# Patient Record
Sex: MALE | Race: WHITE | HISPANIC OR LATINO | Employment: FULL TIME | URBAN - METROPOLITAN AREA
[De-identification: names, ages, dates, MRNs, and addresses within clinical notes are randomized per-mention and may not be internally consistent; named-entity substitution may affect disease eponyms.]

---

## 2024-09-24 ENCOUNTER — HOSPITAL ENCOUNTER (OUTPATIENT)
Facility: HOSPITAL | Age: 34
Setting detail: OBSERVATION
Discharge: HOME | End: 2024-09-25
Attending: EMERGENCY MEDICINE | Admitting: EMERGENCY MEDICINE

## 2024-09-24 ENCOUNTER — APPOINTMENT (OUTPATIENT)
Dept: CT IMAGING | Facility: HOSPITAL | Age: 34
End: 2024-09-24

## 2024-09-24 ENCOUNTER — APPOINTMENT (OUTPATIENT)
Dept: ULTRASOUND IMAGING | Facility: HOSPITAL | Age: 34
End: 2024-09-24

## 2024-09-24 DIAGNOSIS — K37 APPENDICITIS, UNSPECIFIED APPENDICITIS TYPE: Primary | ICD-10-CM

## 2024-09-24 LAB
ALBUMIN SERPL-MCNC: 4.5 G/DL (ref 3.5–5.2)
ALBUMIN/GLOB SERPL: 2 G/DL
ALP SERPL-CCNC: 105 U/L (ref 39–117)
ALT SERPL W P-5'-P-CCNC: 60 U/L (ref 1–41)
ANION GAP SERPL CALCULATED.3IONS-SCNC: 8 MMOL/L (ref 5–15)
AST SERPL-CCNC: 32 U/L (ref 1–40)
BASOPHILS # BLD AUTO: 0.04 10*3/MM3 (ref 0–0.2)
BASOPHILS NFR BLD AUTO: 0.5 % (ref 0–1.5)
BILIRUB SERPL-MCNC: 0.2 MG/DL (ref 0–1.2)
BILIRUB UR QL STRIP: NEGATIVE
BUN SERPL-MCNC: 15 MG/DL (ref 6–20)
BUN/CREAT SERPL: 16 (ref 7–25)
CALCIUM SPEC-SCNC: 10.1 MG/DL (ref 8.6–10.5)
CHLORIDE SERPL-SCNC: 103 MMOL/L (ref 98–107)
CLARITY UR: CLEAR
CO2 SERPL-SCNC: 26 MMOL/L (ref 22–29)
COLOR UR: YELLOW
CREAT SERPL-MCNC: 0.94 MG/DL (ref 0.76–1.27)
DEPRECATED RDW RBC AUTO: 43.8 FL (ref 37–54)
EGFRCR SERPLBLD CKD-EPI 2021: 110.5 ML/MIN/1.73
EOSINOPHIL # BLD AUTO: 0.3 10*3/MM3 (ref 0–0.4)
EOSINOPHIL NFR BLD AUTO: 4.1 % (ref 0.3–6.2)
ERYTHROCYTE [DISTWIDTH] IN BLOOD BY AUTOMATED COUNT: 13.4 % (ref 12.3–15.4)
GLOBULIN UR ELPH-MCNC: 2.3 GM/DL
GLUCOSE SERPL-MCNC: 97 MG/DL (ref 65–99)
GLUCOSE UR STRIP-MCNC: NEGATIVE MG/DL
HCT VFR BLD AUTO: 45.1 % (ref 37.5–51)
HGB BLD-MCNC: 14.8 G/DL (ref 13–17.7)
HGB UR QL STRIP.AUTO: NEGATIVE
IMM GRANULOCYTES # BLD AUTO: 0.03 10*3/MM3 (ref 0–0.05)
IMM GRANULOCYTES NFR BLD AUTO: 0.4 % (ref 0–0.5)
KETONES UR QL STRIP: NEGATIVE
LEUKOCYTE ESTERASE UR QL STRIP.AUTO: NEGATIVE
LIPASE SERPL-CCNC: 16 U/L (ref 13–60)
LYMPHOCYTES # BLD AUTO: 2.64 10*3/MM3 (ref 0.7–3.1)
LYMPHOCYTES NFR BLD AUTO: 36 % (ref 19.6–45.3)
MCH RBC QN AUTO: 29.5 PG (ref 26.6–33)
MCHC RBC AUTO-ENTMCNC: 32.8 G/DL (ref 31.5–35.7)
MCV RBC AUTO: 90 FL (ref 79–97)
MONOCYTES # BLD AUTO: 0.65 10*3/MM3 (ref 0.1–0.9)
MONOCYTES NFR BLD AUTO: 8.9 % (ref 5–12)
NEUTROPHILS NFR BLD AUTO: 3.67 10*3/MM3 (ref 1.7–7)
NEUTROPHILS NFR BLD AUTO: 50.1 % (ref 42.7–76)
NITRITE UR QL STRIP: NEGATIVE
NRBC BLD AUTO-RTO: 0 /100 WBC (ref 0–0.2)
PH UR STRIP.AUTO: 5.5 [PH] (ref 5–8)
PLATELET # BLD AUTO: 165 10*3/MM3 (ref 140–450)
PMV BLD AUTO: 11.4 FL (ref 6–12)
POTASSIUM SERPL-SCNC: 4.5 MMOL/L (ref 3.5–5.2)
PROT SERPL-MCNC: 6.8 G/DL (ref 6–8.5)
PROT UR QL STRIP: NEGATIVE
RBC # BLD AUTO: 5.01 10*6/MM3 (ref 4.14–5.8)
SODIUM SERPL-SCNC: 137 MMOL/L (ref 136–145)
SP GR UR STRIP: 1.02 (ref 1–1.03)
UROBILINOGEN UR QL STRIP: NORMAL
WBC NRBC COR # BLD AUTO: 7.33 10*3/MM3 (ref 3.4–10.8)

## 2024-09-24 PROCEDURE — G0378 HOSPITAL OBSERVATION PER HR: HCPCS

## 2024-09-24 PROCEDURE — 83690 ASSAY OF LIPASE: CPT | Performed by: NURSE PRACTITIONER

## 2024-09-24 PROCEDURE — 80053 COMPREHEN METABOLIC PANEL: CPT | Performed by: NURSE PRACTITIONER

## 2024-09-24 PROCEDURE — 74177 CT ABD & PELVIS W/CONTRAST: CPT

## 2024-09-24 PROCEDURE — 99285 EMERGENCY DEPT VISIT HI MDM: CPT

## 2024-09-24 PROCEDURE — 93976 VASCULAR STUDY: CPT

## 2024-09-24 PROCEDURE — 76870 US EXAM SCROTUM: CPT

## 2024-09-24 PROCEDURE — 96374 THER/PROPH/DIAG INJ IV PUSH: CPT

## 2024-09-24 PROCEDURE — 25010000002 ONDANSETRON PER 1 MG: Performed by: NURSE PRACTITIONER

## 2024-09-24 PROCEDURE — 85025 COMPLETE CBC W/AUTO DIFF WBC: CPT | Performed by: NURSE PRACTITIONER

## 2024-09-24 PROCEDURE — 81003 URINALYSIS AUTO W/O SCOPE: CPT | Performed by: NURSE PRACTITIONER

## 2024-09-24 PROCEDURE — 25510000001 IOPAMIDOL 61 % SOLUTION: Performed by: EMERGENCY MEDICINE

## 2024-09-24 RX ORDER — AMOXICILLIN 250 MG
2 CAPSULE ORAL 2 TIMES DAILY PRN
Status: DISCONTINUED | OUTPATIENT
Start: 2024-09-24 | End: 2024-09-25 | Stop reason: HOSPADM

## 2024-09-24 RX ORDER — SODIUM CHLORIDE 9 MG/ML
40 INJECTION, SOLUTION INTRAVENOUS AS NEEDED
Status: DISCONTINUED | OUTPATIENT
Start: 2024-09-24 | End: 2024-09-25 | Stop reason: HOSPADM

## 2024-09-24 RX ORDER — IOPAMIDOL 612 MG/ML
100 INJECTION, SOLUTION INTRAVASCULAR
Status: COMPLETED | OUTPATIENT
Start: 2024-09-24 | End: 2024-09-24

## 2024-09-24 RX ORDER — BISACODYL 5 MG/1
5 TABLET, DELAYED RELEASE ORAL DAILY PRN
Status: DISCONTINUED | OUTPATIENT
Start: 2024-09-24 | End: 2024-09-25 | Stop reason: HOSPADM

## 2024-09-24 RX ORDER — SODIUM CHLORIDE, SODIUM LACTATE, POTASSIUM CHLORIDE, CALCIUM CHLORIDE 600; 310; 30; 20 MG/100ML; MG/100ML; MG/100ML; MG/100ML
75 INJECTION, SOLUTION INTRAVENOUS CONTINUOUS
Status: DISCONTINUED | OUTPATIENT
Start: 2024-09-25 | End: 2024-09-25 | Stop reason: HOSPADM

## 2024-09-24 RX ORDER — SODIUM CHLORIDE 0.9 % (FLUSH) 0.9 %
10 SYRINGE (ML) INJECTION AS NEEDED
Status: DISCONTINUED | OUTPATIENT
Start: 2024-09-24 | End: 2024-09-25 | Stop reason: HOSPADM

## 2024-09-24 RX ORDER — BISACODYL 10 MG
10 SUPPOSITORY, RECTAL RECTAL DAILY PRN
Status: DISCONTINUED | OUTPATIENT
Start: 2024-09-24 | End: 2024-09-25 | Stop reason: HOSPADM

## 2024-09-24 RX ORDER — POLYETHYLENE GLYCOL 3350 17 G/17G
17 POWDER, FOR SOLUTION ORAL DAILY PRN
Status: DISCONTINUED | OUTPATIENT
Start: 2024-09-24 | End: 2024-09-25 | Stop reason: HOSPADM

## 2024-09-24 RX ORDER — SODIUM CHLORIDE 0.9 % (FLUSH) 0.9 %
10 SYRINGE (ML) INJECTION EVERY 12 HOURS SCHEDULED
Status: DISCONTINUED | OUTPATIENT
Start: 2024-09-24 | End: 2024-09-25 | Stop reason: HOSPADM

## 2024-09-24 RX ORDER — HYDROCODONE BITARTRATE AND ACETAMINOPHEN 7.5; 325 MG/1; MG/1
1 TABLET ORAL ONCE
Status: COMPLETED | OUTPATIENT
Start: 2024-09-24 | End: 2024-09-24

## 2024-09-24 RX ORDER — ONDANSETRON 2 MG/ML
4 INJECTION INTRAMUSCULAR; INTRAVENOUS ONCE
Status: COMPLETED | OUTPATIENT
Start: 2024-09-24 | End: 2024-09-24

## 2024-09-24 RX ADMIN — IOPAMIDOL 85 ML: 612 INJECTION, SOLUTION INTRAVENOUS at 21:17

## 2024-09-24 RX ADMIN — ONDANSETRON 4 MG: 2 INJECTION, SOLUTION INTRAMUSCULAR; INTRAVENOUS at 21:03

## 2024-09-24 RX ADMIN — HYDROCODONE BITARTRATE AND ACETAMINOPHEN 1 TABLET: 7.5; 325 TABLET ORAL at 20:14

## 2024-09-25 VITALS
TEMPERATURE: 98.7 F | DIASTOLIC BLOOD PRESSURE: 67 MMHG | OXYGEN SATURATION: 98 % | RESPIRATION RATE: 18 BRPM | SYSTOLIC BLOOD PRESSURE: 107 MMHG | HEART RATE: 55 BPM | WEIGHT: 197 LBS | HEIGHT: 71 IN | BODY MASS INDEX: 27.58 KG/M2

## 2024-09-25 LAB
ALBUMIN SERPL-MCNC: 4.3 G/DL (ref 3.5–5.2)
ALBUMIN/GLOB SERPL: 1.9 G/DL
ALP SERPL-CCNC: 76 U/L (ref 39–117)
ALT SERPL W P-5'-P-CCNC: 59 U/L (ref 1–41)
ANION GAP SERPL CALCULATED.3IONS-SCNC: 7.1 MMOL/L (ref 5–15)
AST SERPL-CCNC: 32 U/L (ref 1–40)
BASOPHILS # BLD AUTO: 0.03 10*3/MM3 (ref 0–0.2)
BASOPHILS NFR BLD AUTO: 0.4 % (ref 0–1.5)
BILIRUB SERPL-MCNC: 0.5 MG/DL (ref 0–1.2)
BUN SERPL-MCNC: 12 MG/DL (ref 6–20)
BUN/CREAT SERPL: 14.1 (ref 7–25)
CALCIUM SPEC-SCNC: 9.6 MG/DL (ref 8.6–10.5)
CHLORIDE SERPL-SCNC: 104 MMOL/L (ref 98–107)
CO2 SERPL-SCNC: 25.9 MMOL/L (ref 22–29)
CREAT SERPL-MCNC: 0.85 MG/DL (ref 0.76–1.27)
DEPRECATED RDW RBC AUTO: 43.8 FL (ref 37–54)
EGFRCR SERPLBLD CKD-EPI 2021: 118.4 ML/MIN/1.73
EOSINOPHIL # BLD AUTO: 0.26 10*3/MM3 (ref 0–0.4)
EOSINOPHIL NFR BLD AUTO: 3.2 % (ref 0.3–6.2)
ERYTHROCYTE [DISTWIDTH] IN BLOOD BY AUTOMATED COUNT: 13.2 % (ref 12.3–15.4)
GLOBULIN UR ELPH-MCNC: 2.3 GM/DL
GLUCOSE SERPL-MCNC: 97 MG/DL (ref 65–99)
HCT VFR BLD AUTO: 45.5 % (ref 37.5–51)
HGB BLD-MCNC: 15.1 G/DL (ref 13–17.7)
IMM GRANULOCYTES # BLD AUTO: 0.03 10*3/MM3 (ref 0–0.05)
IMM GRANULOCYTES NFR BLD AUTO: 0.4 % (ref 0–0.5)
INR PPP: 1.08 (ref 0.9–1.1)
LYMPHOCYTES # BLD AUTO: 2.53 10*3/MM3 (ref 0.7–3.1)
LYMPHOCYTES NFR BLD AUTO: 30.8 % (ref 19.6–45.3)
MCH RBC QN AUTO: 29.6 PG (ref 26.6–33)
MCHC RBC AUTO-ENTMCNC: 33.2 G/DL (ref 31.5–35.7)
MCV RBC AUTO: 89.2 FL (ref 79–97)
MONOCYTES # BLD AUTO: 0.66 10*3/MM3 (ref 0.1–0.9)
MONOCYTES NFR BLD AUTO: 8 % (ref 5–12)
NEUTROPHILS NFR BLD AUTO: 4.71 10*3/MM3 (ref 1.7–7)
NEUTROPHILS NFR BLD AUTO: 57.2 % (ref 42.7–76)
NRBC BLD AUTO-RTO: 0 /100 WBC (ref 0–0.2)
PLATELET # BLD AUTO: 170 10*3/MM3 (ref 140–450)
PMV BLD AUTO: 11.3 FL (ref 6–12)
POTASSIUM SERPL-SCNC: 4.2 MMOL/L (ref 3.5–5.2)
PROT SERPL-MCNC: 6.6 G/DL (ref 6–8.5)
PROTHROMBIN TIME: 14.2 SECONDS (ref 11.7–14.2)
RBC # BLD AUTO: 5.1 10*6/MM3 (ref 4.14–5.8)
SODIUM SERPL-SCNC: 137 MMOL/L (ref 136–145)
WBC NRBC COR # BLD AUTO: 8.22 10*3/MM3 (ref 3.4–10.8)

## 2024-09-25 PROCEDURE — G0378 HOSPITAL OBSERVATION PER HR: HCPCS

## 2024-09-25 PROCEDURE — 85025 COMPLETE CBC W/AUTO DIFF WBC: CPT

## 2024-09-25 PROCEDURE — 80053 COMPREHEN METABOLIC PANEL: CPT

## 2024-09-25 PROCEDURE — 99204 OFFICE O/P NEW MOD 45 MIN: CPT

## 2024-09-25 PROCEDURE — 85610 PROTHROMBIN TIME: CPT

## 2024-09-25 PROCEDURE — 25810000003 LACTATED RINGERS PER 1000 ML

## 2024-09-25 RX ORDER — ACETAMINOPHEN 325 MG/1
650 TABLET ORAL EVERY 6 HOURS PRN
Status: DISCONTINUED | OUTPATIENT
Start: 2024-09-25 | End: 2024-09-25 | Stop reason: HOSPADM

## 2024-09-25 RX ADMIN — SODIUM CHLORIDE, POTASSIUM CHLORIDE, SODIUM LACTATE AND CALCIUM CHLORIDE 75 ML/HR: 600; 310; 30; 20 INJECTION, SOLUTION INTRAVENOUS at 00:34

## 2024-09-25 RX ADMIN — ACETAMINOPHEN 325MG 650 MG: 325 TABLET ORAL at 08:39

## 2024-09-25 RX ADMIN — Medication 10 ML: at 08:06

## 2024-09-25 RX ADMIN — Medication 10 ML: at 00:34

## 2024-09-25 NOTE — PLAN OF CARE
Goal Outcome Evaluation:         Pt admitted to observation for further evaluation of his right sided testicular pain. Pt reports no pain since arriving to the unit. Surgery consult.       Problem: Adult Inpatient Plan of Care  Goal: Plan of Care Review  Outcome: Progressing  Goal: Patient-Specific Goal (Individualized)  Outcome: Progressing  Goal: Absence of Hospital-Acquired Illness or Injury  Outcome: Progressing  Intervention: Identify and Manage Fall Risk  Recent Flowsheet Documentation  Taken 9/25/2024 0451 by Roshan Montoya RN  Safety Promotion/Fall Prevention: safety round/check completed  Taken 9/25/2024 0230 by Roshan Montoya RN  Safety Promotion/Fall Prevention: safety round/check completed  Taken 9/25/2024 0026 by Roshan Montoya RN  Safety Promotion/Fall Prevention:   safety round/check completed   room organization consistent   clutter free environment maintained   assistive device/personal items within reach  Intervention: Prevent Skin Injury  Recent Flowsheet Documentation  Taken 9/25/2024 0026 by Roshan Montoya RN  Body Position: position changed independently  Goal: Optimal Comfort and Wellbeing  Outcome: Progressing  Intervention: Monitor Pain and Promote Comfort  Recent Flowsheet Documentation  Taken 9/25/2024 0026 by Roshan Montoya RN  Pain Management Interventions: see MAR  Intervention: Provide Person-Centered Care  Recent Flowsheet Documentation  Taken 9/25/2024 0026 by Roshan Montoya RN  Trust Relationship/Rapport:   care explained   choices provided  Goal: Readiness for Transition of Care  Outcome: Progressing  Intervention: Mutually Develop Transition Plan  Recent Flowsheet Documentation  Taken 9/25/2024 0027 by Roshan Montoya RN  Transportation Anticipated: family or friend will provide  Patient/Family Anticipated Services at Transition: none  Patient/Family Anticipates Transition to: home with family  Taken 9/25/2024 0026 by Roshan Montoya RN  Equipment Currently Used at Home:  none     Problem: Pain Acute  Goal: Acceptable Pain Control and Functional Ability  Outcome: Progressing  Intervention: Develop Pain Management Plan  Recent Flowsheet Documentation  Taken 9/25/2024 0026 by Roshan Montoya RN  Pain Management Interventions: see MAR

## 2024-09-25 NOTE — DISCHARGE SUMMARY
ED OBSERVATION PROGRESS/DISCHARGE SUMMARY    Date of Admission: 9/24/2024   LOS: 0 days   PCP: Provider, No Known    Final Diagnosis abdominal pain      Subjective     Hospital Outcome:     Patient is a pleasant afebrile ambulatory 33-year-old  gentleman admitted to ED observation unit for abdominal/testicle pain.  Patient presenting yesterday with right-sided testicular discomfort, states that pain radiated to his right groin his right lower quadrant of his abdomen.    His lab workup was unremarkable.  His testicular ultrasound did not reveal any acute findings.  His CT abdomen pelvis showed a 9 mm appendix without any surrounding inflammatory changes.  Seen and evaluated by general surgery team who recommended against any antibiotic or surgical intervention.    This morning patient states he is feeling well.  He has not had any fevers or chills overnight.  His abdomen is soft and nontender.  Will plan to DC home with outpatient PCP follow-up    ROS:  General: no fevers, chills  Respiratory: no cough, dyspnea  Cardiovascular: no chest pain, palpitations  Abdomen: No abdominal pain, nausea, vomiting, or diarrhea  Neurologic: No focal weakness    Objective   Physical Exam:  I have reviewed the vital signs.  Temp:  [97.5 °F (36.4 °C)-98 °F (36.7 °C)] 97.7 °F (36.5 °C)  Heart Rate:  [50-78] 50  Resp:  [16-18] 18  BP: (107-128)/(47-77) 107/60  General Appearance:    Alert, cooperative, no distress  Head:    Normocephalic, atraumatic  Eyes:    Sclerae anicteric  Neck:   Supple, no mass  Lungs: Clear to auscultation bilaterally, respirations unlabored  Heart: Regular rate and rhythm, S1 and S2 normal, no murmur, rub or gallop  Abdomen:  Soft, nontender, bowel sounds active, nondistended  Extremities: No clubbing, cyanosis, or edema to lower extremities  Pulses:  2+ and symmetric in distal lower extremities  Skin: No rashes   Neurologic: Oriented x3, Normal strength to extremities    Results Review:    I have  reviewed the labs, radiology results and diagnostic studies.    Results from last 7 days   Lab Units 09/25/24  0437   WBC 10*3/mm3 8.22   HEMOGLOBIN g/dL 15.1   HEMATOCRIT % 45.5   PLATELETS 10*3/mm3 170     Results from last 7 days   Lab Units 09/25/24  0437 09/24/24 2058   SODIUM mmol/L 137 137   POTASSIUM mmol/L 4.2 4.5   CHLORIDE mmol/L 104 103   CO2 mmol/L 25.9 26.0   BUN mg/dL 12 15   CREATININE mg/dL 0.85 0.94   CALCIUM mg/dL 9.6 10.1   BILIRUBIN mg/dL 0.5 0.2   ALK PHOS U/L 76 105   ALT (SGPT) U/L 59* 60*   AST (SGOT) U/L 32 32   GLUCOSE mg/dL 97 97     Imaging Results (Last 24 Hours)       Procedure Component Value Units Date/Time    CT Abdomen Pelvis With Contrast [[id number]] Collected: 09/24/24 2152     Updated: 09/24/24 2202    Narrative:      CT OF THE ABDOMEN PELVIS WITH CONTRAST     HISTORY: Right testicle pain     COMPARISON: None available.     TECHNIQUE: Axial CT imaging was obtained through the abdomen and pelvis.  IV contrast was administered.     FINDINGS:  Images through the lung bases demonstrate some areas of scarring versus  atelectasis. No suspicious hepatic lesions are seen. The stomach,  duodenum, adrenal glands, spleen, pancreas, and gallbladder are normal.  Kidneys enhance symmetrically. No hydronephrosis is seen. No urinary  stones are identified. Prostate gland and urinary bladder are normal.  There is colonic diverticulosis. There is no bowel obstruction. The  appendix is prominent in size, measuring up to 9 mm. However, there is  no periappendiceal soft tissue stranding. There is a tiny fat-containing  umbilical hernia. No acute osseous abnormalities are seen.       Impression:      The patient does have a prominent appendix, measuring up to 9 mm in  diameter. I do not see any periappendiceal soft tissue stranding.  However, early appendicitis is not excluded.     Radiation dose reduction techniques were utilized, including automated  exposure control and exposure modulation based  on body size.        This report was finalized on 9/24/2024 9:59 PM by Dr. Kelli Guajardo M.D on Workstation: [id number]       US Scrotum & Testicles with doppler [[id number]] Collected: 09/24/24 2100     Updated: 09/24/24 2105    Narrative:      TESTICULAR ULTRASOUND     INDICATION: Testicle pain     COMPARISON: None available     TECHNIQUE: Grayscale, color and spectral Doppler ultrasound images of  the testicles and scrotal contents obtained.     FINDINGS:    The right testicle measures 4.9 x 3.1 x 2.5 cm and the left testicle  measures 4.7 x 2.9 x 2.3 cm. Both testicles are normal in size, shape  and echogenicity. There is no evidence of testicular mass. Color Doppler  flow is normal without evidence of hyperemia. Normal spectral Doppler  waveforms are present in both testicles. There is no evidence of  testicular torsion. There is no evidence of hydrocele or varicocele.       Impression:      Normal testicular ultrasound     This report was finalized on 9/24/2024 9:02 PM by Dr. Rehan Lewis M.D on Workstation: [id number]               I have reviewed the medications.  ---------------------------------------------------------------------------------------------  Assessment & Plan   Assessment/Problem List    Appendicitis      Plan    Early appendicitis  -Afebrile, denies rebound pain  -WBC 7.33, lipase 16  -UA negative  -CT A/P reveal prominent appendix measuring 9 mm in diameter, there is no periappendiceal soft tissue stranding  -General Surgery consultation, cleared for discharge home     Right testicular pain  Dysuria  -Resolved after p.o. Norco  -Scrotal ultrasound is negative    Disposition: Home    Follow-up after Discharge: PCP    This note will serve as a discharge summary    BOLA Pop 09/25/24 07:16 EDT    I have worn appropriate PPE during this patient encounter, sanitized my hands both with entering and exiting patient's room.      35 minutes has been spent by [facility] providers in the care of this patient while under observation status

## 2024-09-25 NOTE — PROGRESS NOTES
MD ATTESTATION NOTE     SHARED VISIT: This visit was performed by BOTH a physician and an APC. The substantive portion of the medical decision making was performed by this attesting physician who made or approved the management plan and takes responsibility for patient management. All studies in the APC note (if performed) were independently interpreted by me.  The ANA CRISTINA and I have discussed this patient's history, physical exam, and treatment plan. I have reviewed the documentation and personally had a face to face interaction with the patient. I affirm the documentation and agree with the treatment and plan. I provided a substantive portion of the care of the patient.  I personally performed the physical exam in its entirety, and below are my findings.      Brief HPI: Patient still complains of some discomfort in his right lower quadrant.  Denies fever or vomiting.    PHYSICAL EXAM    GENERAL: Awake and alert.  Well-developed, well-nourished male.  Resting comfortably in no acute distress  HENT: nares patent  EYES: no scleral icterus  CV: regular rhythm, normal rate  RESPIRATORY: normal effort, CTAB  ABDOMEN: soft, there is minimal right lower quadrant tenderness without rebound or guarding  MUSCULOSKELETAL: no deformity  NEURO: alert, moves all extremities, follows commands  PSYCH:  calm, cooperative  SKIN: warm, dry    Vital signs and nursing notes reviewed.        Plan: White blood cell count remains normal.  He does not have an acute abdomen.  General surgery consultation is pending.

## 2024-09-25 NOTE — ED PROVIDER NOTES
MD ATTESTATION NOTE    SHARED VISIT: This visit was performed by BOTH a physician and an APC. The substantive portion of the medical decision making was performed by this attesting physician who made or approved the management plan and takes responsibility for patient management. All studies documented in the APC note (if performed) were independently interpreted by me.    The ANA CRISTINA and I have discussed this patient's history, physical exam, MDM, and treatment plan.  I have reviewed the documentation and personally had a face to face interaction with the patient. The attached note describes my personal findings.      Roseline Mayers is a 33 y.o. male who presents to the ED c/o acute right lower quadrant and inguinal pain.  Onset of pain 3 days ago.  It is constant.  He denies having testicular pain.    On exam:  GENERAL: not distressed  HENT: nares patent  EYES: no scleral icterus  CV: regular rhythm, regular rate  RESPIRATORY: normal effort  ABDOMEN: soft, right lower quadrant tenderness without rebound or guarding  : Normal external genitalia  MUSCULOSKELETAL: no deformity  NEURO: alert, moves all extremities, follows commands  SKIN: warm, dry    Labs  Recent Results (from the past 24 hour(s))   Urinalysis With Microscopic If Indicated (No Culture) - Urine, Clean Catch    Collection Time: 09/24/24  8:21 PM    Specimen: Urine, Clean Catch   Result Value Ref Range    Color, UA Yellow Yellow, Straw    Appearance, UA Clear Clear    pH, UA 5.5 5.0 - 8.0    Specific Gravity, UA 1.020 1.005 - 1.030    Glucose, UA Negative Negative    Ketones, UA Negative Negative    Bilirubin, UA Negative Negative    Blood, UA Negative Negative    Protein, UA Negative Negative    Leuk Esterase, UA Negative Negative    Nitrite, UA Negative Negative    Urobilinogen, UA 1.0 E.U./dL 0.2 - 1.0 E.U./dL   Comprehensive Metabolic Panel    Collection Time: 09/24/24  8:58 PM    Specimen: Blood   Result Value Ref Range    Glucose 97 65 -  99 mg/dL    BUN 15 6 - 20 mg/dL    Creatinine 0.94 0.76 - 1.27 mg/dL    Sodium 137 136 - 145 mmol/L    Potassium 4.5 3.5 - 5.2 mmol/L    Chloride 103 98 - 107 mmol/L    CO2 26.0 22.0 - 29.0 mmol/L    Calcium 10.1 8.6 - 10.5 mg/dL    Total Protein 6.8 6.0 - 8.5 g/dL    Albumin 4.5 3.5 - 5.2 g/dL    ALT (SGPT) 60 (H) 1 - 41 U/L    AST (SGOT) 32 1 - 40 U/L    Alkaline Phosphatase 105 39 - 117 U/L    Total Bilirubin 0.2 0.0 - 1.2 mg/dL    Globulin 2.3 gm/dL    A/G Ratio 2.0 g/dL    BUN/Creatinine Ratio 16.0 7.0 - 25.0    Anion Gap 8.0 5.0 - 15.0 mmol/L    eGFR 110.5 >60.0 mL/min/1.73   Lipase    Collection Time: 09/24/24  8:58 PM    Specimen: Blood   Result Value Ref Range    Lipase 16 13 - 60 U/L   CBC Auto Differential    Collection Time: 09/24/24  8:58 PM    Specimen: Blood   Result Value Ref Range    WBC 7.33 3.40 - 10.80 10*3/mm3    RBC 5.01 4.14 - 5.80 10*6/mm3    Hemoglobin 14.8 13.0 - 17.7 g/dL    Hematocrit 45.1 37.5 - 51.0 %    MCV 90.0 79.0 - 97.0 fL    MCH 29.5 26.6 - 33.0 pg    MCHC 32.8 31.5 - 35.7 g/dL    RDW 13.4 12.3 - 15.4 %    RDW-SD 43.8 37.0 - 54.0 fl    MPV 11.4 6.0 - 12.0 fL    Platelets 165 140 - 450 10*3/mm3    Neutrophil % 50.1 42.7 - 76.0 %    Lymphocyte % 36.0 19.6 - 45.3 %    Monocyte % 8.9 5.0 - 12.0 %    Eosinophil % 4.1 0.3 - 6.2 %    Basophil % 0.5 0.0 - 1.5 %    Immature Grans % 0.4 0.0 - 0.5 %    Neutrophils, Absolute 3.67 1.70 - 7.00 10*3/mm3    Lymphocytes, Absolute 2.64 0.70 - 3.10 10*3/mm3    Monocytes, Absolute 0.65 0.10 - 0.90 10*3/mm3    Eosinophils, Absolute 0.30 0.00 - 0.40 10*3/mm3    Basophils, Absolute 0.04 0.00 - 0.20 10*3/mm3    Immature Grans, Absolute 0.03 0.00 - 0.05 10*3/mm3    nRBC 0.0 0.0 - 0.2 /100 WBC       Radiology  CT Abdomen Pelvis With Contrast    Result Date: 9/24/2024  CT OF THE ABDOMEN PELVIS WITH CONTRAST  HISTORY: Right testicle pain  COMPARISON: None available.  TECHNIQUE: Axial CT imaging was obtained through the abdomen and pelvis. IV contrast was  administered.  FINDINGS: Images through the lung bases demonstrate some areas of scarring versus atelectasis. No suspicious hepatic lesions are seen. The stomach, duodenum, adrenal glands, spleen, pancreas, and gallbladder are normal. Kidneys enhance symmetrically. No hydronephrosis is seen. No urinary stones are identified. Prostate gland and urinary bladder are normal. There is colonic diverticulosis. There is no bowel obstruction. The appendix is prominent in size, measuring up to 9 mm. However, there is no periappendiceal soft tissue stranding. There is a tiny fat-containing umbilical hernia. No acute osseous abnormalities are seen.      The patient does have a prominent appendix, measuring up to 9 mm in diameter. I do not see any periappendiceal soft tissue stranding. However, early appendicitis is not excluded.  Radiation dose reduction techniques were utilized, including automated exposure control and exposure modulation based on body size.   This report was finalized on 9/24/2024 9:59 PM by Dr. Kelli Guajardo M.D on Workstation: [id number]      US Scrotum & Testicles with doppler    Result Date: 9/24/2024  TESTICULAR ULTRASOUND  INDICATION: Testicle pain  COMPARISON: None available  TECHNIQUE: Grayscale, color and spectral Doppler ultrasound images of the testicles and scrotal contents obtained.  FINDINGS:  The right testicle measures 4.9 x 3.1 x 2.5 cm and the left testicle measures 4.7 x 2.9 x 2.3 cm. Both testicles are normal in size, shape and echogenicity. There is no evidence of testicular mass. Color Doppler flow is normal without evidence of hyperemia. Normal spectral Doppler waveforms are present in both testicles. There is no evidence of testicular torsion. There is no evidence of hydrocele or varicocele.      Normal testicular ultrasound  This report was finalized on 9/24/2024 9:02 PM by Dr. Rehan Lewis M.D on Workstation: [id number]       Medications given in the ED:  Medications    sodium chloride 0.9 % flush 10 mL (has no administration in time range)   sodium chloride 0.9 % flush 10 mL (has no administration in time range)   sodium chloride 0.9 % flush 10 mL (has no administration in time range)   sodium chloride 0.9 % infusion 40 mL (has no administration in time range)   Potassium Replacement - Follow Nurse / BPA Driven Protocol (has no administration in time range)   Magnesium Standard Dose Replacement - Follow Nurse / BPA Driven Protocol (has no administration in time range)   Phosphorus Replacement - Follow Nurse / BPA Driven Protocol (has no administration in time range)   Calcium Replacement - Follow Nurse / BPA Driven Protocol (has no administration in time range)   sennosides-docusate (PERICOLACE) 8.6-50 MG per tablet 2 tablet (has no administration in time range)     And   polyethylene glycol (MIRALAX) packet 17 g (has no administration in time range)     And   bisacodyl (DULCOLAX) EC tablet 5 mg (has no administration in time range)     And   bisacodyl (DULCOLAX) suppository 10 mg (has no administration in time range)   HYDROcodone-acetaminophen (NORCO) 7.5-325 MG per tablet 1 tablet (1 tablet Oral Given 9/24/24 2014)   ondansetron (ZOFRAN) injection 4 mg (4 mg Intravenous Given 9/24/24 2103)   iopamidol (ISOVUE-300) 61 % injection 100 mL (85 mL Intravenous Given 9/24/24 2117)       Orders placed during this visit:  Orders Placed This Encounter   Procedures   • US Scrotum & Testicles with doppler   • CT Abdomen Pelvis With Contrast   • Urinalysis With Microscopic If Indicated (No Culture) - Urine, Clean Catch   • Comprehensive Metabolic Panel   • Lipase   • CBC Auto Differential   • CBC Auto Differential   • Comprehensive Metabolic Panel   • Protime-INR   • NPO Diet NPO Type: Strict NPO   • Vital Signs   • Intake & Output   • Weigh Patient   • Oral Care   • Saline Lock & Maintain IV Access   • Place Sequential Compression Device   • Maintain Sequential Compression Device   •  Continuous Pulse Oximetry   • Activity - Ad Frances   • Surgery (on-call MD unless specified)   • Insert Peripheral IV   • Insert Peripheral IV   • Initiate ED Observation Status   • CBC & Differential       Medical Decision Making:  ED Course as of 09/26/24 1613   Tue Sep 24, 2024   2003 BP: 116/64 [AR]   2028 I discussed this case with Dr. Dubose. [AR]   2221 Patient reassessed.  Discussed ED findings, differential diagnosis, and the need for admission for evaluation/treatment.  They are agreeable to admission and all questions were answered.     [AR]   2235 Discussed case with Dr. Broussard (general surgery).  Reviewed history, exam, results and treatments.  Dr. Broussard agrees with admission for observation, states no antibiotics at this time.  Surgery will consult. [AR]   2249 Phone call with BOLA Michaels with [facility].  Discussed the patient, relevant history, exam, diagnostics, ED findings/progress, and concerns. They agree to admit the patient to Dr. Roberson. Care assumed by the admitting physician at this time.     [AR]      ED Course User Index  [AR] Colleen Lindquist APRN       Differential diagnosis:  Differential diagnosis includes but not limited to:  - hepatobiliary pathology such as cholecystitis, cholangitis, and symptomatic cholelithiasis  - Pancreatitis  - Dyspepsia  - Small bowel obstruction  - Appendicitis  - Diverticulitis  - UTI including pyelonephritis  - Ureteral stone  - Zoster  - Colitis, including infectious and ischemic  - testicular torsion  - epididymitis  - hernia    Diagnosis  Final diagnoses:   Appendicitis, unspecified appendicitis type          Ronal Dubose II, MD  09/24/24 2307       Ronal Dubose II, MD  09/26/24 1613

## 2024-09-25 NOTE — CONSULTS
General Surgery     Summary:     Roseline Mayers is a 33 y.o. year old with right testicular/inguinal pain.  CT noting 9mm dilated appendix without periappendiceal tissue stranding, with no other acute findings.  Ultrasound of scrotum and testicles was normal. Today the patient's pain has resolved.  Patient's abdominal exam is completely benign.  He is afebrile with normal labs and vitals. Patient states he does not wish to have any surgical intervention.     Plan:   Discussed patient's findings with Dr. Vasquez whom has also seen patient and reviewed his CT: No indications for surgical intervention, no indications for antibiotics, okay for discharge from a general surgery standpoint    *A  was used for this encounter    Chief Complaint:    Right testicular pain    History of Present Illness:     Roseline Mayers is a healthy 33 y.o. year old who presented to the ED with right testicular pain that he states radiated to the right lower quadrant of his abdomen.  Reports the pain started about 3 days ago and describes the pain as achy.  Denies any injury or trauma recently.  Denies associated nausea, vomiting, fever, chills, or diarrhea.  He stated movements made the pain worse, ibuprofen relieves the pain.  Denies anything like this before.  Last bowel movement was yesterday, he is pretty regular.  Denies any hematochezia or melena. States today his pain is no longer there.     Past Medical History:   Denies any medical history    Past Surgical History:    Denies any prior abdominal surgeries    Family History:    Reviewed. Non pertinent      Social History:    Denies alcohol use  Denies tobacco use    Allergies:   No known allergies    Medications:   Denies any daily medications    Radiology/Endoscopy:    CT abdomen pelvis with contrast on 9/24/2024  FINDINGS:  Images through the lung bases demonstrate some areas of scarring versus  atelectasis. No suspicious hepatic lesions are  seen. The stomach,  duodenum, adrenal glands, spleen, pancreas, and gallbladder are normal.  Kidneys enhance symmetrically. No hydronephrosis is seen. No urinary  stones are identified. Prostate gland and urinary bladder are normal.  There is colonic diverticulosis. There is no bowel obstruction. The  appendix is prominent in size, measuring up to 9 mm. However, there is  no periappendiceal soft tissue stranding. There is a tiny fat-containing  umbilical hernia. No acute osseous abnormalities are seen.     IMPRESSION:  The patient does have a prominent appendix, measuring up to 9 mm in  diameter. I do not see any periappendiceal soft tissue stranding.  However, early appendicitis is not excluded.     This report was finalized on 9/24/2024 9:59 PM by Dr. Kelli Guajardo M.D on Workstation: [id number]    Ultrasound scrotum and testicles with Doppler on 9/24/24  FINDINGS:    The right testicle measures 4.9 x 3.1 x 2.5 cm and the left testicle  measures 4.7 x 2.9 x 2.3 cm. Both testicles are normal in size, shape  and echogenicity. There is no evidence of testicular mass. Color Doppler  flow is normal without evidence of hyperemia. Normal spectral Doppler  waveforms are present in both testicles. There is no evidence of  testicular torsion. There is no evidence of hydrocele or varicocele.     IMPRESSION:  Normal testicular ultrasound     This report was finalized on 9/24/2024 9:02 PM by Dr. Rehan Lewis M.D on Workstation: [id number]    Labs:    Results from last 7 days   Lab Units 09/25/24 0437 09/24/24 2058   WBC 10*3/mm3 8.22 7.33   HEMOGLOBIN g/dL 15.1 14.8   HEMATOCRIT % 45.5 45.1   PLATELETS 10*3/mm3 170 165     Results from last 7 days   Lab Units 09/25/24  0437 09/24/24 2058   SODIUM mmol/L 137 137   POTASSIUM mmol/L 4.2 4.5   CHLORIDE mmol/L 104 103   CO2 mmol/L 25.9 26.0   BUN mg/dL 12 15   CREATININE mg/dL 0.85 0.94   CALCIUM mg/dL 9.6 10.1   BILIRUBIN mg/dL 0.5 0.2   ALK PHOS U/L 76 105   ALT  (SGPT) U/L 59* 60*   AST (SGOT) U/L 32 32   GLUCOSE mg/dL 97 97   Lipase 16    BMI 27.49  Weight 89.4 kg    Vitals  Temp 98.7  HR 55  RR 18  /67  SpO2 98    Review of Systems   Constitutional:  Negative for appetite change, chills and fever.   HENT:  Negative for sore throat and trouble swallowing.    Respiratory:  Negative for chest tightness and shortness of breath.    Cardiovascular:  Negative for chest pain and palpitations.   Gastrointestinal:  Negative for abdominal pain, blood in stool, constipation, diarrhea, nausea and vomiting.   Genitourinary:  Negative for dysuria.   Musculoskeletal:  Negative for arthralgias and myalgias.   Skin:  Negative for rash and wound.   Neurological:  Negative for dizziness and light-headedness.   Psychiatric/Behavioral:  The patient is not nervous/anxious.         Physical Exam  Constitutional:       General: He is not in acute distress.     Appearance: Normal appearance. He is not ill-appearing.   Eyes:      Extraocular Movements: Extraocular movements intact.      Pupils: Pupils are equal, round, and reactive to light.   Cardiovascular:      Rate and Rhythm: Normal rate.      Pulses: Normal pulses.   Pulmonary:      Effort: Pulmonary effort is normal.   Abdominal:      General: Abdomen is flat. There is no distension.      Palpations: Abdomen is soft. There is no mass.      Tenderness: There is no abdominal tenderness. There is no guarding or rebound.      Hernia: No hernia is present.   Musculoskeletal:         General: No deformity. Normal range of motion.   Skin:     General: Skin is warm and dry.   Neurological:      General: No focal deficit present.      Mental Status: He is alert and oriented to person, place, and time.   Psychiatric:         Mood and Affect: Mood normal.         Behavior: Behavior normal.                   BOLA Crespo   General and Endoscopic Surgery  [facility]    [address], [address]  [place], [place], [address]  P:  [phone]  F: [phone]

## 2024-09-25 NOTE — ED PROVIDER NOTES
EMERGENCY DEPARTMENT ENCOUNTER  Room Number:  40/40  PCP: Provider, No Known  Independent Historians: Patient with       HPI:  Chief Complaint: had concerns including Testicle Pain.     A complete HPI/ROS/PMH/PSH/SH/FH are unobtainable due to: None    Chronic or social conditions impacting patient care (Social Determinants of Health): None      Context: Roseline Mayers is a 33 y.o. male who presents to the emergency department with complaints of right-sided testicle pain.  Symptoms started 3 days ago and have been constant.  Pain is described as an ache.  He is unaware of any alleviating or aggravating factors.  Admits to dysuria.  Patient denies fever, chills, headache, lightheadedness, dizziness, numbness, tingling, unilateral extremity weakness, syncope, shortness of breath, chest pain, abdominal pain, nausea, vomiting, diarrhea, hematuria, or other complaints.      Review of prior external notes (non-ED) -and- Review of prior external test results outside of this encounter:   Extensive review of the EPIC system as well as [facility] reveals no prior visit notes and no prior diagnostic studies available for review.       PAST MEDICAL HISTORY  Active Ambulatory Problems     Diagnosis Date Noted    No Active Ambulatory Problems     Resolved Ambulatory Problems     Diagnosis Date Noted    No Resolved Ambulatory Problems     No Additional Past Medical History         PAST SURGICAL HISTORY  No past surgical history on file.      FAMILY HISTORY  History reviewed. No pertinent family history.      SOCIAL HISTORY  Social History     Socioeconomic History    Marital status: Single         ALLERGIES  Patient has no known allergies.      REVIEW OF SYSTEMS  Included in HPI  All systems reviewed and negative except for those discussed in HPI.      PHYSICAL EXAM    I have reviewed the triage vital signs and nursing notes.    ED Triage Vitals [09/24/24 1951]   Temp Heart Rate Resp BP SpO2   97.5 °F  (36.4 °C) 78 16 -- 99 %      Temp src Heart Rate Source Patient Position BP Location FiO2 (%)   -- -- -- -- --       GENERAL: not distressed  HENT: nares patent  Head/neck/ face are symmetric without gross deformity or swelling  EYES: no scleral icterus  CV: regular rhythm, regular rate with intact distal pulses  RESPIRATORY: normal effort and no respiratory distress  ABDOMEN: soft and nontender  MUSCULOSKELETAL: no deformity  NEURO: alert and appropriate, moves all extremities, follows commands  SKIN: warm, dry    Vital signs and nursing notes reviewed.      LAB RESULTS  Recent Results (from the past 24 hour(s))   Urinalysis With Microscopic If Indicated (No Culture) - Urine, Clean Catch    Collection Time: 09/24/24  8:21 PM    Specimen: Urine, Clean Catch   Result Value Ref Range    Color, UA Yellow Yellow, Straw    Appearance, UA Clear Clear    pH, UA 5.5 5.0 - 8.0    Specific Gravity, UA 1.020 1.005 - 1.030    Glucose, UA Negative Negative    Ketones, UA Negative Negative    Bilirubin, UA Negative Negative    Blood, UA Negative Negative    Protein, UA Negative Negative    Leuk Esterase, UA Negative Negative    Nitrite, UA Negative Negative    Urobilinogen, UA 1.0 E.U./dL 0.2 - 1.0 E.U./dL   Comprehensive Metabolic Panel    Collection Time: 09/24/24  8:58 PM    Specimen: Blood   Result Value Ref Range    Glucose 97 65 - 99 mg/dL    BUN 15 6 - 20 mg/dL    Creatinine 0.94 0.76 - 1.27 mg/dL    Sodium 137 136 - 145 mmol/L    Potassium 4.5 3.5 - 5.2 mmol/L    Chloride 103 98 - 107 mmol/L    CO2 26.0 22.0 - 29.0 mmol/L    Calcium 10.1 8.6 - 10.5 mg/dL    Total Protein 6.8 6.0 - 8.5 g/dL    Albumin 4.5 3.5 - 5.2 g/dL    ALT (SGPT) 60 (H) 1 - 41 U/L    AST (SGOT) 32 1 - 40 U/L    Alkaline Phosphatase 105 39 - 117 U/L    Total Bilirubin 0.2 0.0 - 1.2 mg/dL    Globulin 2.3 gm/dL    A/G Ratio 2.0 g/dL    BUN/Creatinine Ratio 16.0 7.0 - 25.0    Anion Gap 8.0 5.0 - 15.0 mmol/L    eGFR 110.5 >60.0 mL/min/1.73   Lipase     Collection Time: 09/24/24  8:58 PM    Specimen: Blood   Result Value Ref Range    Lipase 16 13 - 60 U/L   CBC Auto Differential    Collection Time: 09/24/24  8:58 PM    Specimen: Blood   Result Value Ref Range    WBC 7.33 3.40 - 10.80 10*3/mm3    RBC 5.01 4.14 - 5.80 10*6/mm3    Hemoglobin 14.8 13.0 - 17.7 g/dL    Hematocrit 45.1 37.5 - 51.0 %    MCV 90.0 79.0 - 97.0 fL    MCH 29.5 26.6 - 33.0 pg    MCHC 32.8 31.5 - 35.7 g/dL    RDW 13.4 12.3 - 15.4 %    RDW-SD 43.8 37.0 - 54.0 fl    MPV 11.4 6.0 - 12.0 fL    Platelets 165 140 - 450 10*3/mm3    Neutrophil % 50.1 42.7 - 76.0 %    Lymphocyte % 36.0 19.6 - 45.3 %    Monocyte % 8.9 5.0 - 12.0 %    Eosinophil % 4.1 0.3 - 6.2 %    Basophil % 0.5 0.0 - 1.5 %    Immature Grans % 0.4 0.0 - 0.5 %    Neutrophils, Absolute 3.67 1.70 - 7.00 10*3/mm3    Lymphocytes, Absolute 2.64 0.70 - 3.10 10*3/mm3    Monocytes, Absolute 0.65 0.10 - 0.90 10*3/mm3    Eosinophils, Absolute 0.30 0.00 - 0.40 10*3/mm3    Basophils, Absolute 0.04 0.00 - 0.20 10*3/mm3    Immature Grans, Absolute 0.03 0.00 - 0.05 10*3/mm3    nRBC 0.0 0.0 - 0.2 /100 WBC         RADIOLOGY  CT Abdomen Pelvis With Contrast    Result Date: 9/24/2024  CT OF THE ABDOMEN PELVIS WITH CONTRAST  HISTORY: Right testicle pain  COMPARISON: None available.  TECHNIQUE: Axial CT imaging was obtained through the abdomen and pelvis. IV contrast was administered.  FINDINGS: Images through the lung bases demonstrate some areas of scarring versus atelectasis. No suspicious hepatic lesions are seen. The stomach, duodenum, adrenal glands, spleen, pancreas, and gallbladder are normal. Kidneys enhance symmetrically. No hydronephrosis is seen. No urinary stones are identified. Prostate gland and urinary bladder are normal. There is colonic diverticulosis. There is no bowel obstruction. The appendix is prominent in size, measuring up to 9 mm. However, there is no periappendiceal soft tissue stranding. There is a tiny fat-containing umbilical  hernia. No acute osseous abnormalities are seen.      The patient does have a prominent appendix, measuring up to 9 mm in diameter. I do not see any periappendiceal soft tissue stranding. However, early appendicitis is not excluded.  Radiation dose reduction techniques were utilized, including automated exposure control and exposure modulation based on body size.   This report was finalized on 9/24/2024 9:59 PM by Dr. Kelli Guajardo M.D on Workstation: [id number]      US Scrotum & Testicles with doppler    Result Date: 9/24/2024  TESTICULAR ULTRASOUND  INDICATION: Testicle pain  COMPARISON: None available  TECHNIQUE: Grayscale, color and spectral Doppler ultrasound images of the testicles and scrotal contents obtained.  FINDINGS:  The right testicle measures 4.9 x 3.1 x 2.5 cm and the left testicle measures 4.7 x 2.9 x 2.3 cm. Both testicles are normal in size, shape and echogenicity. There is no evidence of testicular mass. Color Doppler flow is normal without evidence of hyperemia. Normal spectral Doppler waveforms are present in both testicles. There is no evidence of testicular torsion. There is no evidence of hydrocele or varicocele.      Normal testicular ultrasound  This report was finalized on 9/24/2024 9:02 PM by Dr. Rehan Lewis M.D on Workstation: [id number]         MEDICATIONS GIVEN IN ER  Medications   sodium chloride 0.9 % flush 10 mL (has no administration in time range)   sodium chloride 0.9 % flush 10 mL (has no administration in time range)   sodium chloride 0.9 % flush 10 mL (has no administration in time range)   sodium chloride 0.9 % infusion 40 mL (has no administration in time range)   Potassium Replacement - Follow Nurse / BPA Driven Protocol (has no administration in time range)   Magnesium Standard Dose Replacement - Follow Nurse / BPA Driven Protocol (has no administration in time range)   Phosphorus Replacement - Follow Nurse / BPA Driven Protocol (has no administration in time  range)   Calcium Replacement - Follow Nurse / BPA Driven Protocol (has no administration in time range)   sennosides-docusate (PERICOLACE) 8.6-50 MG per tablet 2 tablet (has no administration in time range)     And   polyethylene glycol (MIRALAX) packet 17 g (has no administration in time range)     And   bisacodyl (DULCOLAX) EC tablet 5 mg (has no administration in time range)     And   bisacodyl (DULCOLAX) suppository 10 mg (has no administration in time range)   HYDROcodone-acetaminophen (NORCO) 7.5-325 MG per tablet 1 tablet (1 tablet Oral Given 9/24/24 2014)   ondansetron (ZOFRAN) injection 4 mg (4 mg Intravenous Given 9/24/24 2103)   iopamidol (ISOVUE-300) 61 % injection 100 mL (85 mL Intravenous Given 9/24/24 2117)           OUTPATIENT MEDICATION MANAGEMENT:  Current Facility-Administered Medications Ordered in Epic   Medication Dose Route Frequency Provider Last Rate Last Admin    sennosides-docusate (PERICOLACE) 8.6-50 MG per tablet 2 tablet  2 tablet Oral BID PRN Xenia Andino APRN        And    polyethylene glycol (MIRALAX) packet 17 g  17 g Oral Daily PRN Xenia Andino APRN        And    bisacodyl (DULCOLAX) EC tablet 5 mg  5 mg Oral Daily PRN Xenia Andino APRN        And    bisacodyl (DULCOLAX) suppository 10 mg  10 mg Rectal Daily PRN Xenia Andino APRN        Calcium Replacement - Follow Nurse / BPA Driven Protocol   Does not apply PRN Xenia Andino APRN        Magnesium Standard Dose Replacement - Follow Nurse / BPA Driven Protocol   Does not apply PRN Xneia Andino APRN        Phosphorus Replacement - Follow Nurse / BPA Driven Protocol   Does not apply PRN Xenia Andino APRN        Potassium Replacement - Follow Nurse / BPA Driven Protocol   Does not apply PRN Xenia Andino APRN        sodium chloride 0.9 % flush 10 mL  10 mL Intravenous PRN Colleen Lindquist APRN        sodium chloride 0.9 % flush 10 mL  10 mL Intravenous Q12H Xenia Andino APRN        sodium chloride 0.9 % flush 10 mL  10 mL Intravenous PRN  Xenia Andino APRN        sodium chloride 0.9 % infusion 40 mL  40 mL Intravenous PRN Xenia Andino APRN         No current Epic-ordered outpatient medications on file.         PROGRESS, DATA ANALYSIS, CONSULTS, AND MEDICAL DECISION MAKING  ORDERS PLACED DURING THIS VISIT:  Orders Placed This Encounter   Procedures    US Scrotum & Testicles with doppler    CT Abdomen Pelvis With Contrast    Urinalysis With Microscopic If Indicated (No Culture) - Urine, Clean Catch    Comprehensive Metabolic Panel    Lipase    CBC Auto Differential    CBC Auto Differential    Comprehensive Metabolic Panel    Protime-INR    NPO Diet NPO Type: Strict NPO    Vital Signs    Intake & Output    Weigh Patient    Oral Care    Saline Lock & Maintain IV Access    Place Sequential Compression Device    Maintain Sequential Compression Device    Continuous Pulse Oximetry    Activity - Ad Frances    Surgery (on-call MD unless specified)    Insert Peripheral IV    Insert Peripheral IV    Initiate ED Observation Status    CBC & Differential       All labs have been independently interpreted by me.  All radiology studies have been reviewed by me. All EKG's have been independently viewed by me.  Discussion below represents my analysis of pertinent findings related to patient's condition, differential diagnosis, treatment plan and final disposition.    Differential diagnosis includes but is not limited to:   Testicular torsion, UTI, pyelonephritis, etc.    ED Course/Progress Notes/MDM:  ED Course as of 09/25/24 0456   Tue Sep 24, 2024   2003 BP: 116/64 [AR]   2028 I discussed this case with Dr. Dubose. [AR]   2221 Patient reassessed.  Discussed ED findings, differential diagnosis, and the need for admission for evaluation/treatment.  They are agreeable to admission and all questions were answered.     [AR]   2235 Discussed case with Dr. Broussard (general surgery).  Reviewed history, exam, results and treatments.  Dr. Broussard agrees with admission for  observation, states no antibiotics at this time.  Surgery will consult. [AR]   2247 Phone call with BOLA Michaels with [facility].  Discussed the patient, relevant history, exam, diagnostics, ED findings/progress, and concerns. They agree to admit the patient to Dr. Roberson. Care assumed by the admitting physician at this time.     [AR]      ED Course User Index  [AR] Colleen Lindquist APRN           AS OF 22:50 EDT VITALS:    BP - 112/47  HR - 54  TEMP - 97.5 °F (36.4 °C)  O2 SATS - 96%        COMPLEXITY OF CARE  The patient requires admission.        DIAGNOSIS  Final diagnoses:   Appendicitis, unspecified appendicitis type         DISPOSITION  ED Disposition       ED Disposition   Decision to Admit    Condition   --    Comment   --                    Please note that portions of this document were completed with a voice recognition program.    Note Disclaimer: At [facility], we believe that sharing information builds trust and better relationships. You are receiving this note because you recently visited [facility]. It is possible you will see health information before a provider has talked with you about it. This kind of information can be easy to misunderstand. To help you fully understand what it means for your health, we urge you to discuss this note with your provider.     Colleen Lindquist APRN  09/25/24 0455       Colleen Lindquist APRN  09/25/24 0455

## 2024-09-25 NOTE — ED NOTES
Nursing report ED to floor  Roseline Mayers  33 y.o.  male    HPI :  HPI (Adult)  Stated Reason for Visit: r testicle pain  History Obtained From: patient    Chief Complaint  Chief Complaint   Patient presents with    Testicle Pain       Admitting doctor:   Shiraz Roberson MD    Admitting diagnosis:   The encounter diagnosis was Appendicitis, unspecified appendicitis type.    Code status:   Current Code Status       Date Active Code Status Order ID Comments User Context       Not on file            Allergies:   Patient has no known allergies.    Isolation:   No active isolations    Intake and Output  No intake or output data in the 24 hours ending 09/24/24 2255    Weight:       09/24/24 1951   Weight: 83.9 kg (185 lb)       Most recent vitals:   Vitals:    09/24/24 1954 09/24/24 2001 09/24/24 2100 09/24/24 2101   BP: 128/77 116/64  112/47   Pulse:   54    Resp:       Temp:       SpO2:   97% 96%   Weight:       Height:           Active LDAs/IV Access:   Lines, Drains & Airways       Active LDAs       Name Placement date Placement time Site Days    Peripheral IV 09/24/24 2058 Left Antecubital 09/24/24 2058  Antecubital  less than 1                    Labs (abnormal labs have a star):   Labs Reviewed   COMPREHENSIVE METABOLIC PANEL - Abnormal; Notable for the following components:       Result Value    ALT (SGPT) 60 (*)     All other components within normal limits    Narrative:     GFR Normal >60  Chronic Kidney Disease <60  Kidney Failure <15     URINALYSIS W/ MICROSCOPIC IF INDICATED (NO CULTURE) - Normal    Narrative:     Urine microscopic not indicated.   LIPASE - Normal   CBC WITH AUTO DIFFERENTIAL - Normal   CBC WITH AUTO DIFFERENTIAL   COMPREHENSIVE METABOLIC PANEL   PROTIME-INR   CBC AND DIFFERENTIAL    Narrative:     The following orders were created for panel order CBC & Differential.  Procedure                               Abnormality         Status                     ---------                                -----------         ------                     CBC Auto Differential[[id number]]        Normal              Final result                 Please view results for these tests on the individual orders.       EKG:   No orders to display       Meds given in ED:   Medications   sodium chloride 0.9 % flush 10 mL (has no administration in time range)   sodium chloride 0.9 % flush 10 mL (has no administration in time range)   sodium chloride 0.9 % flush 10 mL (has no administration in time range)   sodium chloride 0.9 % infusion 40 mL (has no administration in time range)   Potassium Replacement - Follow Nurse / BPA Driven Protocol (has no administration in time range)   Magnesium Standard Dose Replacement - Follow Nurse / BPA Driven Protocol (has no administration in time range)   Phosphorus Replacement - Follow Nurse / BPA Driven Protocol (has no administration in time range)   Calcium Replacement - Follow Nurse / BPA Driven Protocol (has no administration in time range)   sennosides-docusate (PERICOLACE) 8.6-50 MG per tablet 2 tablet (has no administration in time range)     And   polyethylene glycol (MIRALAX) packet 17 g (has no administration in time range)     And   bisacodyl (DULCOLAX) EC tablet 5 mg (has no administration in time range)     And   bisacodyl (DULCOLAX) suppository 10 mg (has no administration in time range)   HYDROcodone-acetaminophen (NORCO) 7.5-325 MG per tablet 1 tablet (1 tablet Oral Given 9/24/24 2014)   ondansetron (ZOFRAN) injection 4 mg (4 mg Intravenous Given 9/24/24 2103)   iopamidol (ISOVUE-300) 61 % injection 100 mL (85 mL Intravenous Given 9/24/24 2117)       Imaging results:  CT Abdomen Pelvis With Contrast    Result Date: 9/24/2024  The patient does have a prominent appendix, measuring up to 9 mm in diameter. I do not see any periappendiceal soft tissue stranding. However, early appendicitis is not excluded.  Radiation dose reduction techniques were utilized, including  automated exposure control and exposure modulation based on body size.   This report was finalized on 9/24/2024 9:59 PM by Dr. Kelli Guajardo M.D on Workstation: [id number]      US Scrotum & Testicles with doppler    Result Date: 9/24/2024  Normal testicular ultrasound  This report was finalized on 9/24/2024 9:02 PM by Dr. Rehan Lewis M.D on Workstation: [id number]       Ambulatory status:   - independent    Social issues:   Social History     Socioeconomic History    Marital status: Single       Peripheral Neurovascular  Peripheral Neurovascular (Adult)  Peripheral Neurovascular WDL: WDL    Neuro Cognitive  Neuro Cognitive (Adult)  Cognitive/Neuro/Behavioral WDL: WDL    Learning  Learning Assessment (Adult)  Learning Readiness and Ability: language barrier identified  :  refused, other (see comments) (patient requested family at bedside to interpret)  Education Provided  Person Taught: patient, family member/friend  Teaching Method: verbal instruction  Teaching Focus: symptom/problem overview, risk factors/triggers  Education Outcome Evaluation: eager to learn, acceptance expressed, verbalizes understanding    Respiratory  Respiratory WDL  Respiratory WDL: WDL    Abdominal Pain       Pain Assessments  Pain (Adult)  (0-10) Pain Rating: Rest: 5    NIH Stroke Scale       Aiyana Sweet RN  09/24/24 22:55 EDT

## 2024-09-25 NOTE — H&P
[facility]   HISTORY AND PHYSICAL    Patient Name: Roseline Mayers  : 1990  MRN: [id number]  Primary Care Physician:  Karley, No Known  Date of admission: 2024    Subjective   Subjective     Chief Complaint:   Chief Complaint   Patient presents with    Testicle Pain         HPI:    Roseline Mayers is a 33 y.o. male without a significant medical or surgical history presents with right testicular pain that resolved after p.o. Hughes.  He states symptoms started about 3 days ago, without apparent aggravation.  He denies trauma, injury to his groin or abdomen.  Reports some dysuria, denies blood in urine, denies right lower quadrant rebound pain.  Testicles nontender, no scrotal edema.      ED workup includes unremarkable CBC, CMP, lipase 16, UA negative.  CT A/P reveal a prominent appendix without periappendiceal soft tissue stranding.  Scrotal ultrasound within normal limits.    Review of Systems   All systems were reviewed and negative except for: Right testicle pain      Personal History     No past medical history on file.    No past surgical history on file.    Family History: family history is not on file. Otherwise pertinent FHx was reviewed and not pertinent to current issue.    Social History:      Home Medications:       Allergies:  No Known Allergies    Objective   Objective     Vitals:   Temp:  [97.5 °F (36.4 °C)] 97.5 °F (36.4 °C)  Heart Rate:  [54-78] 54  Resp:  [16] 16  BP: (112-128)/(47-77) 112/47   Physical Exam:     Constitutional: Awake, alert. Well developed for age. Nontoxic appearing.   Eyes: PERRL x2, sclerae anicteric, no conjunctival injection. No EOM abnormalities.   HENT: NCAT, mucous membranes moist,   Neck: Supple, no thyromegaly, no lymphadenopathy, trachea midline  Respiratory: Clear to auscultation bilaterally, nonlabored respirations   Cardiovascular: RRR, no murmurs, rubs, or gallops, palpable pedal pulses bilaterally. No appreciable edema.    Gastrointestinal: Positive bowel sounds, soft, nontender, not distended.   Musculoskeletal: No bilateral ankle edema, no clubbing or cyanosis to extremities. No obvious deformities.   Psychiatric: Appropriate affect, cooperative. Converses appropriately for age.   Neurologic: Oriented x 3, strength symmetric in all extremities. Cranial nerves grossly intact to confrontation, speech clear  Skin: No rashes, skin intact.     Result Review    Result Review:  I have personally reviewed the results from the time of this admission to 9/24/2024 22:49 EDT and agree with these findings:  [x]  Laboratory list / accordion  []  Microbiology  [x]  Radiology  []  EKG/Telemetry   []  Cardiology/Vascular   []  Pathology  []  Old records  []  Other:  Most notable findings include:       ED workup includes unremarkable CBC, CMP, lipase 16, UA negative.  CT A/P reveal a prominent appendix without periappendiceal soft tissue stranding.  Scrotal ultrasound within normal limits.      Assessment & Plan   Assessment / Plan     Brief Patient Summary:  Roseline Mayers is a 33 y.o. male who presents with the right sided testicular pain, scrotal ultrasound is negative, however, CT A/P reveals prominent appendix without periappendiceal stranding.    Active Hospital Problems:  Active Hospital Problems    Diagnosis     **Appendicitis      Plan:     Early appendicitis  -Afebrile, denies rebound pain  -WBC 7.33, lipase 16  -UA negative  -CT A/P reveal prominent appendix measuring 9 mm in diameter, there is no periappendiceal soft tissue stranding  -General Surgery consultation    Right testicular pain  Dysuria  -Resolved after p.o. Norco  -Scrotal ultrasound is negative    VTE Prophylaxis:  Mechanical VTE prophylaxis orders are present.        CODE STATUS:       Admission Status:  I believe this patient meets observation status.    Electronically signed by BOLA Lucas, 09/24/24, 10:49 PM EDT.        75 minutes has been spent by  [facility] providers in the care of this patient while under observation status      I have worn appropriate PPE during this patient encounter, sanitized my hands both with entering and exiting patient's room.    I have discussed plan of care with patient including advance care plan and/or surrogate decision maker.  Patient advises that their spouse will be their primary surrogate decision maker

## 2024-09-26 NOTE — CASE MANAGEMENT/SOCIAL WORK
Case Management Discharge Note      Final Note: Home; self-care. Shari GUSTAFSON         Selected Continued Care - Discharged on 9/25/2024 Admission date: 9/24/2024 - Discharge disposition: Home or Self Care      Destination    No services have been selected for the patient.                Durable Medical Equipment    No services have been selected for the patient.                Dialysis/Infusion    No services have been selected for the patient.                Home Medical Care    No services have been selected for the patient.                Therapy    No services have been selected for the patient.                Community Resources    No services have been selected for the patient.                Community & DME    No services have been selected for the patient.                         Final Discharge Disposition Code: 01 - home or self-care